# Patient Record
Sex: MALE | Race: BLACK OR AFRICAN AMERICAN | NOT HISPANIC OR LATINO | Employment: OTHER | URBAN - METROPOLITAN AREA
[De-identification: names, ages, dates, MRNs, and addresses within clinical notes are randomized per-mention and may not be internally consistent; named-entity substitution may affect disease eponyms.]

---

## 2024-01-22 ENCOUNTER — HOSPITAL ENCOUNTER (OUTPATIENT)
Dept: RADIOLOGY | Facility: EXTERNAL LOCATION | Age: 74
Discharge: HOME | End: 2024-01-22

## 2024-01-22 DIAGNOSIS — M25.512 LEFT SHOULDER PAIN, UNSPECIFIED CHRONICITY: ICD-10-CM

## 2024-02-06 ENCOUNTER — OFFICE VISIT (OUTPATIENT)
Dept: ORTHOPEDIC SURGERY | Facility: CLINIC | Age: 74
End: 2024-02-06
Payer: MEDICARE

## 2024-02-06 DIAGNOSIS — M54.12 CERVICAL RADICULOPATHY: ICD-10-CM

## 2024-02-06 PROCEDURE — 1125F AMNT PAIN NOTED PAIN PRSNT: CPT | Performed by: FAMILY MEDICINE

## 2024-02-06 PROCEDURE — 1160F RVW MEDS BY RX/DR IN RCRD: CPT | Performed by: FAMILY MEDICINE

## 2024-02-06 PROCEDURE — 1036F TOBACCO NON-USER: CPT | Performed by: FAMILY MEDICINE

## 2024-02-06 PROCEDURE — 99204 OFFICE O/P NEW MOD 45 MIN: CPT | Performed by: FAMILY MEDICINE

## 2024-02-06 PROCEDURE — 1159F MED LIST DOCD IN RCRD: CPT | Performed by: FAMILY MEDICINE

## 2024-02-06 RX ORDER — SIMVASTATIN 40 MG/1
1 TABLET, FILM COATED ORAL NIGHTLY
COMMUNITY
Start: 2016-03-25

## 2024-02-06 RX ORDER — EMPAGLIFLOZIN 10 MG/1
10 TABLET, FILM COATED ORAL
COMMUNITY
Start: 2023-12-15

## 2024-02-06 RX ORDER — SITAGLIPTIN AND METFORMIN HYDROCHLORIDE 1000; 50 MG/1; MG/1
2 TABLET, FILM COATED, EXTENDED RELEASE ORAL
COMMUNITY
Start: 2023-12-15

## 2024-02-06 RX ORDER — LOSARTAN POTASSIUM 50 MG/1
50 TABLET ORAL
COMMUNITY
Start: 2023-12-15

## 2024-02-06 ASSESSMENT — PAIN SCALES - GENERAL: PAINLEVEL_OUTOF10: 7

## 2024-02-06 ASSESSMENT — PAIN - FUNCTIONAL ASSESSMENT: PAIN_FUNCTIONAL_ASSESSMENT: 0-10

## 2024-02-06 ASSESSMENT — PAIN DESCRIPTION - DESCRIPTORS: DESCRIPTORS: BURNING

## 2024-02-06 NOTE — PROGRESS NOTES
"** Please excuse any errors in grammar or translation related to this dictation. Voice recognition software was utilized to prepare this document. **    Assessment & Plan:  Discussed with patient that his current presentation is more suggestive of cervical radiculopathy opposed to an isolated shoulder issue.  He has full active range of motion and strength of his shoulder compared to the contralateral without any pain with provocative maneuvers.  The report of burning sensation starting in his neck and radiated to his mid arm is more aligned with radicular pain.  Discussed with patient this is caused by cervical nerve being impinged by either degenerative joint disease or disc herniation.  No myelopathy signs or symptoms that warrant urgent surgical referral.  Recommend physical therapy to work on cervical postural strengthening.  Referring patient to pain management for further evaluation and management of this condition which may include epidural steroid injection to mitigate his symptoms further.  As he recently completed oral steroids did not offer him a prescription for this.  Can take OTC analgesics as needed.  All questions answered and patient agrees this plan of care.      Chief complaint:  Left shoulder pain    HPI:  73-year-old male, history of diabetes, presents with left shoulder pain.  Pain has been ongoing for several months however did acutely worsen about 2 weeks ago.  He describes pain as a burning and tingling pain starting in his left side of neck and radiating to the middle of his arm.  No associated weakness or numbness.  He states he is unable to \"touch\" the site of the pain.  No specific aggravating activities to include overhead lifting or lying on his side.  The pain is present at all times.  When the pain acutely worsens he went to the urgent care and was prescribed a Medrol Dosepak.  He reports his symptoms did moderately improve with the use of oral steroids however did not fully " resolve.    Exam:  LEFT Shoulder Exam:  No swelling, warmth or ecchymosis of shoulder present.   AROM: Flexion 170 [id number]; Abduction 170 [id number]; IR 70 of 70deg at 90deg; ER 90 of 90deg at 90deg; Horizontal Adduction 130 [id number]  Non-TTP over clavicle, AC joint, subacromial space, posterior GHJ line, bicipital groove, greater tuberosity, deltoid  5/5 strength in ER, IR, abduction, flexion   SILT in all dermatomal distributions C5-T1  LABRUM:-O´vamsi´s,-crank test,-biceps load test  INSTABILITY:-apprehension  IMPINGEMENT:-Hawkin´-] Neer´-] painful arc  ROTATOR CUF-l Limb ruthann-] lag sign-e Empty Can (SS-b Belly press (SSc-] Hornblower (IS/TM)  BICEP-] Speed´-] Yergason´s  AC JOIN-s Scarf test    Cervical Spine Exam:  Limited AROM with extension, sidebending to left. Normal AROM in flexion, rotation.  No step-offs. No midline tenderness.   Tenderness of cervical paraspinals and left-side trapezius.  C5: LOUIS 55]/5 shoulder abduction and biceps flexion  C6: LOUIS 55]/5 wrist extension  C7: LOUIS 55]/5 tricep extension and wrist flexion  C8: LOUIS 55]/5 finger flexion  T1: SILT  5]/5 finger abduction  +  Spurling´s    Results:  X-ray of left shoulder obtained 1/22/2024 reviewed and independent interpreted as mild AC joint degenerative changes.  No acute fracture.  Normal alignment.